# Patient Record
Sex: FEMALE | Race: BLACK OR AFRICAN AMERICAN | URBAN - METROPOLITAN AREA
[De-identification: names, ages, dates, MRNs, and addresses within clinical notes are randomized per-mention and may not be internally consistent; named-entity substitution may affect disease eponyms.]

---

## 2021-11-18 ENCOUNTER — OFFICE VISIT (OUTPATIENT)
Dept: URBAN - METROPOLITAN AREA CLINIC 92 | Facility: CLINIC | Age: 31
End: 2021-11-18
Payer: COMMERCIAL

## 2021-11-18 ENCOUNTER — WEB ENCOUNTER (OUTPATIENT)
Dept: URBAN - METROPOLITAN AREA CLINIC 92 | Facility: CLINIC | Age: 31
End: 2021-11-18

## 2021-11-18 ENCOUNTER — LAB OUTSIDE AN ENCOUNTER (OUTPATIENT)
Dept: URBAN - METROPOLITAN AREA CLINIC 92 | Facility: CLINIC | Age: 31
End: 2021-11-18

## 2021-11-18 DIAGNOSIS — R10.13 DYSPEPSIA: ICD-10-CM

## 2021-11-18 PROCEDURE — 83013 H PYLORI (C-13) BREATH: CPT | Performed by: INTERNAL MEDICINE

## 2021-11-18 PROCEDURE — 83014 H PYLORI DRUG ADMIN: CPT | Performed by: INTERNAL MEDICINE

## 2021-11-18 PROCEDURE — 99204 OFFICE O/P NEW MOD 45 MIN: CPT | Performed by: INTERNAL MEDICINE

## 2021-11-18 RX ORDER — OMEPRAZOLE 40 MG/1
1 CAPSULE 30 MINUTES BEFORE MORNING MEAL CAPSULE, DELAYED RELEASE ORAL ONCE A DAY
Qty: 14 | Refills: 0 | OUTPATIENT
Start: 2021-11-18

## 2021-11-18 NOTE — HPI-TODAY'S VISIT:
Pt with nausea without emesis x 1 month. Increased with po intake. Denies emesis, gerd,  gi bleeding. 6lb wt loss. No previous EGD. Denies marijuana. No lower GI symptoms.   Pt is an [employer] student.

## 2021-12-10 ENCOUNTER — TELEPHONE ENCOUNTER (OUTPATIENT)
Dept: URBAN - METROPOLITAN AREA CLINIC 92 | Facility: CLINIC | Age: 31
End: 2021-12-10

## 2021-12-14 ENCOUNTER — TELEPHONE ENCOUNTER (OUTPATIENT)
Dept: URBAN - METROPOLITAN AREA CLINIC 92 | Facility: CLINIC | Age: 31
End: 2021-12-14

## 2022-01-10 ENCOUNTER — LAB OUTSIDE AN ENCOUNTER (OUTPATIENT)
Dept: URBAN - METROPOLITAN AREA CLINIC 5 | Facility: CLINIC | Age: 32
End: 2022-01-10

## 2022-01-10 ENCOUNTER — TELEPHONE ENCOUNTER (OUTPATIENT)
Dept: URBAN - METROPOLITAN AREA CLINIC 5 | Facility: CLINIC | Age: 32
End: 2022-01-10

## 2022-01-27 ENCOUNTER — OFFICE VISIT (OUTPATIENT)
Dept: URBAN - METROPOLITAN AREA TELEHEALTH 2 | Facility: TELEHEALTH | Age: 32
End: 2022-01-27

## 2022-01-28 ENCOUNTER — OFFICE VISIT (OUTPATIENT)
Dept: URBAN - METROPOLITAN AREA TELEHEALTH 2 | Facility: TELEHEALTH | Age: 32
End: 2022-01-28
Payer: COMMERCIAL

## 2022-01-28 DIAGNOSIS — K31.84 GASTROPARESIS: ICD-10-CM

## 2022-01-28 PROCEDURE — 97802 MEDICAL NUTRITION INDIV IN: CPT | Performed by: DIETITIAN, REGISTERED

## 2022-01-28 RX ORDER — OMEPRAZOLE 40 MG/1
1 CAPSULE 30 MINUTES BEFORE MORNING MEAL CAPSULE, DELAYED RELEASE ORAL ONCE A DAY
Qty: 14 | Refills: 0 | Status: ACTIVE | COMMUNITY
Start: 2021-11-18

## 2022-02-11 ENCOUNTER — OFFICE VISIT (OUTPATIENT)
Dept: URBAN - METROPOLITAN AREA TELEHEALTH 2 | Facility: TELEHEALTH | Age: 32
End: 2022-02-11

## 2022-02-11 RX ORDER — OMEPRAZOLE 40 MG/1
1 CAPSULE 30 MINUTES BEFORE MORNING MEAL CAPSULE, DELAYED RELEASE ORAL ONCE A DAY
Qty: 14 | Refills: 0 | OUTPATIENT

## 2022-02-11 NOTE — HPI-TODAY'S VISIT:
31yoF seen in November with nausea without emesis x 1 month. Increased with po intake. Denies emesis, gerd,  gi bleeding. 6lb wt loss. No previous EGD. Denies marijuana. No lower GI symptoms.  GES with T1/2 of 99m (normal <60). Met with dietician re gastroparesis diet and on PPI and h pylori Pt is an [employer] student.

## 2022-03-22 ENCOUNTER — DASHBOARD ENCOUNTERS (OUTPATIENT)
Age: 32
End: 2022-03-22

## 2022-03-23 ENCOUNTER — OFFICE VISIT (OUTPATIENT)
Dept: URBAN - METROPOLITAN AREA TELEHEALTH 2 | Facility: TELEHEALTH | Age: 32
End: 2022-03-23

## 2022-03-23 RX ORDER — OMEPRAZOLE 40 MG/1
1 CAPSULE 30 MINUTES BEFORE MORNING MEAL CAPSULE, DELAYED RELEASE ORAL ONCE A DAY
Qty: 14 | Refills: 0 | OUTPATIENT

## 2022-03-23 NOTE — HPI-TODAY'S VISIT:
31yoF seen in November with nausea without emesis x 1 month. Increased with po intake. Denies emesis, gerd,  gi bleeding. 6lb wt loss. No previous EGD. Denies marijuana. No lower GI symptoms.  GES with T1/2 of 99m (normal <60). Met with dietician re gastroparesis diet and on PPI and h pylori Pt is an [employer] student.

## 2022-03-28 ENCOUNTER — OFFICE VISIT (OUTPATIENT)
Dept: URBAN - METROPOLITAN AREA TELEHEALTH 2 | Facility: TELEHEALTH | Age: 32
End: 2022-03-28
Payer: COMMERCIAL

## 2022-03-28 ENCOUNTER — OFFICE VISIT (OUTPATIENT)
Dept: URBAN - METROPOLITAN AREA TELEHEALTH 2 | Facility: TELEHEALTH | Age: 32
End: 2022-03-28

## 2022-03-28 VITALS — HEIGHT: 65 IN

## 2022-03-28 DIAGNOSIS — K59.09 CHRONIC CONSTIPATION: ICD-10-CM

## 2022-03-28 DIAGNOSIS — R11.0 NAUSEA: ICD-10-CM

## 2022-03-28 DIAGNOSIS — K31.84 GASTROPARESIS: ICD-10-CM

## 2022-03-28 PROBLEM — 235675006: Status: ACTIVE | Noted: 2022-01-10

## 2022-03-28 PROCEDURE — 99213 OFFICE O/P EST LOW 20 MIN: CPT | Performed by: INTERNAL MEDICINE

## 2022-03-28 RX ORDER — OMEPRAZOLE 40 MG/1
1 CAPSULE 30 MINUTES BEFORE MORNING MEAL CAPSULE, DELAYED RELEASE ORAL ONCE A DAY
Qty: 14 | Refills: 0 | COMMUNITY

## 2022-03-28 RX ORDER — OMEPRAZOLE 40 MG/1
1 CAPSULE 30 MINUTES BEFORE MORNING MEAL CAPSULE, DELAYED RELEASE ORAL ONCE A DAY
Qty: 14 | Refills: 0 | Status: ACTIVE | COMMUNITY

## 2022-03-28 NOTE — HPI-TODAY'S VISIT:
31yoF seen in November with nausea without emesis x 1 month and post-prandial fullness for years.  Sx increased with po intake. No gerd sx, gi bleeding. Had 6lb wt loss. No previous EGD. Denies marijuana use.  Had GES with T1/2 of 99m (normal <60). Met with dietician re gastroparesis diet and notes nausea sign improved. Still with intermittent post-prandial fullness but better. No fuether weight loss. Off PPI. BM 3/week is her baseline. No hematochezia or melena.  Pt is an [employer] student